# Patient Record
Sex: MALE | HISPANIC OR LATINO | Employment: FULL TIME | URBAN - METROPOLITAN AREA
[De-identification: names, ages, dates, MRNs, and addresses within clinical notes are randomized per-mention and may not be internally consistent; named-entity substitution may affect disease eponyms.]

---

## 2018-08-07 ENCOUNTER — OFFICE VISIT - HEALTHEAST (OUTPATIENT)
Dept: FAMILY MEDICINE | Facility: CLINIC | Age: 28
End: 2018-08-07

## 2018-08-07 DIAGNOSIS — H66.001 ACUTE SUPPURATIVE OTITIS MEDIA OF RIGHT EAR WITHOUT SPONTANEOUS RUPTURE OF TYMPANIC MEMBRANE, RECURRENCE NOT SPECIFIED: ICD-10-CM

## 2018-08-07 ASSESSMENT — MIFFLIN-ST. JEOR: SCORE: 1965.27

## 2018-09-10 ENCOUNTER — OFFICE VISIT - HEALTHEAST (OUTPATIENT)
Dept: FAMILY MEDICINE | Facility: CLINIC | Age: 28
End: 2018-09-10

## 2018-09-10 DIAGNOSIS — Z13.220 SCREENING FOR CHOLESTEROL LEVEL: ICD-10-CM

## 2018-09-10 DIAGNOSIS — Z00.00 ROUTINE GENERAL MEDICAL EXAMINATION AT A HEALTH CARE FACILITY: ICD-10-CM

## 2018-09-10 DIAGNOSIS — Z13.0 SCREENING FOR DEFICIENCY ANEMIA: ICD-10-CM

## 2018-09-10 DIAGNOSIS — Z11.3 SCREEN FOR STD (SEXUALLY TRANSMITTED DISEASE): ICD-10-CM

## 2018-09-10 DIAGNOSIS — R03.0 ELEVATED BLOOD PRESSURE READING WITHOUT DIAGNOSIS OF HYPERTENSION: ICD-10-CM

## 2018-09-10 DIAGNOSIS — R73.03 PRE-DIABETES: ICD-10-CM

## 2018-09-10 LAB
ALBUMIN SERPL-MCNC: 4.2 G/DL (ref 3.5–5)
ALP SERPL-CCNC: 74 U/L (ref 45–120)
ALT SERPL W P-5'-P-CCNC: 50 U/L (ref 0–45)
ANION GAP SERPL CALCULATED.3IONS-SCNC: 9 MMOL/L (ref 5–18)
AST SERPL W P-5'-P-CCNC: 24 U/L (ref 0–40)
BILIRUB SERPL-MCNC: 2 MG/DL (ref 0–1)
BUN SERPL-MCNC: 13 MG/DL (ref 8–22)
CALCIUM SERPL-MCNC: 9.5 MG/DL (ref 8.5–10.5)
CHLORIDE BLD-SCNC: 105 MMOL/L (ref 98–107)
CHOLEST SERPL-MCNC: 207 MG/DL
CO2 SERPL-SCNC: 27 MMOL/L (ref 22–31)
CREAT SERPL-MCNC: 0.92 MG/DL (ref 0.7–1.3)
ERYTHROCYTE [DISTWIDTH] IN BLOOD BY AUTOMATED COUNT: 11.3 % (ref 11–14.5)
GFR SERPL CREATININE-BSD FRML MDRD: >60 ML/MIN/1.73M2
GLUCOSE BLD-MCNC: 90 MG/DL (ref 70–125)
HBA1C MFR BLD: 5.1 % (ref 3.5–6)
HCT VFR BLD AUTO: 48.9 % (ref 40–54)
HGB BLD-MCNC: 16.8 G/DL (ref 14–18)
HIV 1+2 AB+HIV1 P24 AG SERPL QL IA: NEGATIVE
MCH RBC QN AUTO: 30.8 PG (ref 27–34)
MCHC RBC AUTO-ENTMCNC: 34.3 G/DL (ref 32–36)
MCV RBC AUTO: 90 FL (ref 80–100)
PLATELET # BLD AUTO: 201 THOU/UL (ref 140–440)
PMV BLD AUTO: 8.8 FL (ref 7–10)
POTASSIUM BLD-SCNC: 3.8 MMOL/L (ref 3.5–5)
PROT SERPL-MCNC: 7.3 G/DL (ref 6–8)
RBC # BLD AUTO: 5.44 MILL/UL (ref 4.4–6.2)
SODIUM SERPL-SCNC: 141 MMOL/L (ref 136–145)
TSH SERPL DL<=0.005 MIU/L-ACNC: 0.95 UIU/ML (ref 0.3–5)
WBC: 6.6 THOU/UL (ref 4–11)

## 2018-09-10 ASSESSMENT — MIFFLIN-ST. JEOR: SCORE: 1951.66

## 2018-09-11 LAB
C TRACH DNA SPEC QL PROBE+SIG AMP: NEGATIVE
CHOLEST SERPL-MCNC: 205 MG/DL
HDLC SERPL-MCNC: 31 MG/DL
LDLC SERPL CALC-MCNC: 135 MG/DL
N GONORRHOEA DNA SPEC QL NAA+PROBE: NEGATIVE
T PALLIDUM AB SER QL: NEGATIVE
TRIGL SERPL-MCNC: 197 MG/DL

## 2018-09-12 LAB
HAV IGM SERPL QL IA: NEGATIVE
HBV CORE IGM SERPL QL IA: NEGATIVE
HBV SURFACE AG SERPL QL IA: NEGATIVE
HCV AB SERPL QL IA: NEGATIVE

## 2018-10-01 ENCOUNTER — OFFICE VISIT - HEALTHEAST (OUTPATIENT)
Dept: FAMILY MEDICINE | Facility: CLINIC | Age: 28
End: 2018-10-01

## 2018-10-01 DIAGNOSIS — I10 ESSENTIAL HYPERTENSION: ICD-10-CM

## 2018-10-01 DIAGNOSIS — R74.8 ELEVATED LIVER ENZYMES: ICD-10-CM

## 2018-10-01 LAB
ALBUMIN SERPL-MCNC: 3.9 G/DL (ref 3.5–5)
ALP SERPL-CCNC: 78 U/L (ref 45–120)
ALT SERPL W P-5'-P-CCNC: 40 U/L (ref 0–45)
AST SERPL W P-5'-P-CCNC: 19 U/L (ref 0–40)
BILIRUB DIRECT SERPL-MCNC: 0.3 MG/DL
BILIRUB SERPL-MCNC: 1.5 MG/DL (ref 0–1)
PROT SERPL-MCNC: 7.1 G/DL (ref 6–8)

## 2018-11-26 ENCOUNTER — COMMUNICATION - HEALTHEAST (OUTPATIENT)
Dept: FAMILY MEDICINE | Facility: CLINIC | Age: 28
End: 2018-11-26

## 2018-11-26 DIAGNOSIS — I10 BENIGN ESSENTIAL HYPERTENSION: ICD-10-CM

## 2018-12-06 ENCOUNTER — COMMUNICATION - HEALTHEAST (OUTPATIENT)
Dept: FAMILY MEDICINE | Facility: CLINIC | Age: 28
End: 2018-12-06

## 2019-01-23 ENCOUNTER — COMMUNICATION - HEALTHEAST (OUTPATIENT)
Dept: FAMILY MEDICINE | Facility: CLINIC | Age: 29
End: 2019-01-23

## 2019-02-20 ENCOUNTER — OFFICE VISIT - HEALTHEAST (OUTPATIENT)
Dept: FAMILY MEDICINE | Facility: CLINIC | Age: 29
End: 2019-02-20

## 2019-02-20 DIAGNOSIS — I10 BENIGN ESSENTIAL HYPERTENSION: ICD-10-CM

## 2019-02-20 DIAGNOSIS — R42 LIGHTHEADEDNESS: ICD-10-CM

## 2019-03-01 ENCOUNTER — COMMUNICATION - HEALTHEAST (OUTPATIENT)
Dept: FAMILY MEDICINE | Facility: CLINIC | Age: 29
End: 2019-03-01

## 2019-03-01 DIAGNOSIS — I10 BENIGN ESSENTIAL HYPERTENSION: ICD-10-CM

## 2019-03-15 ENCOUNTER — COMMUNICATION - HEALTHEAST (OUTPATIENT)
Dept: SCHEDULING | Facility: CLINIC | Age: 29
End: 2019-03-15

## 2019-03-28 ENCOUNTER — COMMUNICATION - HEALTHEAST (OUTPATIENT)
Dept: FAMILY MEDICINE | Facility: CLINIC | Age: 29
End: 2019-03-28

## 2019-03-28 DIAGNOSIS — I10 BENIGN ESSENTIAL HYPERTENSION: ICD-10-CM

## 2019-07-25 ENCOUNTER — OFFICE VISIT - HEALTHEAST (OUTPATIENT)
Dept: FAMILY MEDICINE | Facility: CLINIC | Age: 29
End: 2019-07-25

## 2019-07-25 DIAGNOSIS — Z83.3 FAMILY HISTORY OF DIABETES MELLITUS: ICD-10-CM

## 2019-07-25 DIAGNOSIS — I10 ESSENTIAL HYPERTENSION: ICD-10-CM

## 2019-07-25 LAB
ANION GAP SERPL CALCULATED.3IONS-SCNC: 7 MMOL/L (ref 5–18)
BUN SERPL-MCNC: 13 MG/DL (ref 8–22)
CALCIUM SERPL-MCNC: 10 MG/DL (ref 8.5–10.5)
CHLORIDE BLD-SCNC: 106 MMOL/L (ref 98–107)
CO2 SERPL-SCNC: 29 MMOL/L (ref 22–31)
CREAT SERPL-MCNC: 0.99 MG/DL (ref 0.7–1.3)
GFR SERPL CREATININE-BSD FRML MDRD: >60 ML/MIN/1.73M2
GLUCOSE BLD-MCNC: 85 MG/DL (ref 70–125)
POTASSIUM BLD-SCNC: 4.4 MMOL/L (ref 3.5–5)
SODIUM SERPL-SCNC: 142 MMOL/L (ref 136–145)

## 2019-07-25 ASSESSMENT — MIFFLIN-ST. JEOR: SCORE: 1900.97

## 2019-09-30 ENCOUNTER — COMMUNICATION - HEALTHEAST (OUTPATIENT)
Dept: FAMILY MEDICINE | Facility: CLINIC | Age: 29
End: 2019-09-30

## 2019-09-30 DIAGNOSIS — I10 BENIGN ESSENTIAL HYPERTENSION: ICD-10-CM

## 2020-09-26 ENCOUNTER — COMMUNICATION - HEALTHEAST (OUTPATIENT)
Dept: FAMILY MEDICINE | Facility: CLINIC | Age: 30
End: 2020-09-26

## 2020-09-26 DIAGNOSIS — I10 BENIGN ESSENTIAL HYPERTENSION: ICD-10-CM

## 2020-10-28 ENCOUNTER — COMMUNICATION - HEALTHEAST (OUTPATIENT)
Dept: FAMILY MEDICINE | Facility: CLINIC | Age: 30
End: 2020-10-28

## 2020-10-28 DIAGNOSIS — I10 BENIGN ESSENTIAL HYPERTENSION: ICD-10-CM

## 2020-12-07 ENCOUNTER — COMMUNICATION - HEALTHEAST (OUTPATIENT)
Dept: FAMILY MEDICINE | Facility: CLINIC | Age: 30
End: 2020-12-07

## 2020-12-07 DIAGNOSIS — I10 BENIGN ESSENTIAL HYPERTENSION: ICD-10-CM

## 2020-12-14 ENCOUNTER — OFFICE VISIT - HEALTHEAST (OUTPATIENT)
Dept: FAMILY MEDICINE | Facility: CLINIC | Age: 30
End: 2020-12-14

## 2020-12-14 DIAGNOSIS — Z13.220 SCREENING FOR CHOLESTEROL LEVEL: ICD-10-CM

## 2020-12-14 DIAGNOSIS — Z13.220 SCREENING FOR LIPOID DISORDERS: Primary | ICD-10-CM

## 2020-12-14 DIAGNOSIS — I10 BENIGN ESSENTIAL HYPERTENSION: ICD-10-CM

## 2020-12-14 DIAGNOSIS — I10 BENIGN HYPERTENSION: ICD-10-CM

## 2021-04-18 ENCOUNTER — COMMUNICATION - HEALTHEAST (OUTPATIENT)
Dept: FAMILY MEDICINE | Facility: CLINIC | Age: 31
End: 2021-04-18

## 2021-04-19 ENCOUNTER — COMMUNICATION - HEALTHEAST (OUTPATIENT)
Dept: FAMILY MEDICINE | Facility: CLINIC | Age: 31
End: 2021-04-19

## 2021-04-19 DIAGNOSIS — I10 BENIGN ESSENTIAL HYPERTENSION: ICD-10-CM

## 2021-04-22 ENCOUNTER — AMBULATORY - HEALTHEAST (OUTPATIENT)
Dept: LAB | Facility: CLINIC | Age: 31
End: 2021-04-22

## 2021-04-22 ENCOUNTER — AMBULATORY - HEALTHEAST (OUTPATIENT)
Dept: FAMILY MEDICINE | Facility: CLINIC | Age: 31
End: 2021-04-22

## 2021-04-22 DIAGNOSIS — R73.01 ELEVATED FASTING GLUCOSE: ICD-10-CM

## 2021-04-22 DIAGNOSIS — Z13.220 SCREENING FOR CHOLESTEROL LEVEL: ICD-10-CM

## 2021-04-22 DIAGNOSIS — I10 BENIGN ESSENTIAL HYPERTENSION: ICD-10-CM

## 2021-04-22 LAB
ALBUMIN SERPL-MCNC: 4.3 G/DL (ref 3.5–5)
ALP SERPL-CCNC: 70 U/L (ref 45–120)
ALT SERPL W P-5'-P-CCNC: 93 U/L (ref 0–45)
ANION GAP SERPL CALCULATED.3IONS-SCNC: 9 MMOL/L (ref 5–18)
AST SERPL W P-5'-P-CCNC: 43 U/L (ref 0–40)
BILIRUB SERPL-MCNC: 2.1 MG/DL (ref 0–1)
BUN SERPL-MCNC: 9 MG/DL (ref 8–22)
CALCIUM SERPL-MCNC: 9.2 MG/DL (ref 8.5–10.5)
CHLORIDE BLD-SCNC: 102 MMOL/L (ref 98–107)
CHOLEST SERPL-MCNC: 225 MG/DL
CO2 SERPL-SCNC: 29 MMOL/L (ref 22–31)
CREAT SERPL-MCNC: 0.95 MG/DL (ref 0.7–1.3)
FASTING STATUS PATIENT QL REPORTED: NO
GFR SERPL CREATININE-BSD FRML MDRD: >60 ML/MIN/1.73M2
GLUCOSE BLD-MCNC: 101 MG/DL (ref 70–125)
HDLC SERPL-MCNC: 33 MG/DL
LDLC SERPL CALC-MCNC: 159 MG/DL
POTASSIUM BLD-SCNC: 4.1 MMOL/L (ref 3.5–5)
PROT SERPL-MCNC: 7.4 G/DL (ref 6–8)
SODIUM SERPL-SCNC: 140 MMOL/L (ref 136–145)
TRIGL SERPL-MCNC: 164 MG/DL

## 2021-04-23 ENCOUNTER — COMMUNICATION - HEALTHEAST (OUTPATIENT)
Dept: FAMILY MEDICINE | Facility: CLINIC | Age: 31
End: 2021-04-23

## 2021-04-27 ENCOUNTER — AMBULATORY - HEALTHEAST (OUTPATIENT)
Dept: FAMILY MEDICINE | Facility: CLINIC | Age: 31
End: 2021-04-27

## 2021-04-27 DIAGNOSIS — R79.89 ELEVATED LFTS: ICD-10-CM

## 2021-05-10 ENCOUNTER — HOSPITAL ENCOUNTER (OUTPATIENT)
Dept: ULTRASOUND IMAGING | Facility: CLINIC | Age: 31
Discharge: HOME | End: 2021-05-10
Attending: NURSE PRACTITIONER
Payer: COMMERCIAL

## 2021-05-10 DIAGNOSIS — R79.89 ELEVATED LFTS: ICD-10-CM

## 2021-05-11 ENCOUNTER — AMBULATORY - HEALTHEAST (OUTPATIENT)
Dept: FAMILY MEDICINE | Facility: CLINIC | Age: 31
End: 2021-05-11

## 2021-05-11 DIAGNOSIS — K76.0 HEPATIC STEATOSIS: ICD-10-CM

## 2021-05-27 NOTE — TELEPHONE ENCOUNTER
Refill Approved    Rx renewed per Medication Renewal Policy. Medication was last renewed on 3/4/19.    Janette Webb, [facility] Triage/Med Refill 3/29/2019     Requested Prescriptions   Pending Prescriptions Disp Refills     lisinopril (PRINIVIL,ZESTRIL) 20 MG tablet [Pharmacy Med Name: LISINOPRIL 20 MG TABLET] 30 tablet 0     Sig: TAKE 1 TABLET BY MOUTH EVERY DAY    [facility] [employer] Refill Protocol Passed - 3/28/2019  1:34 PM       Passed - PCP or prescribing provider visit in past 12 months      Last office visit with prescriber/PCP: 2/20/2019 Katarina Najera CNP OR same dept: 2/20/2019 Katarina Najera CNP OR same specialty: 2/20/2019 Katarina Najera CNP  Last physical: 9/10/2018 Last MTM visit: Visit date not found   Next visit within 3 mo: Visit date not found  Next physical within 3 mo: Visit date not found  Prescriber OR PCP: Katarina Najera CNP  Last diagnosis associated with med order: 1. Benign essential hypertension  - lisinopril (PRINIVIL,ZESTRIL) 20 MG tablet [Pharmacy Med Name: LISINOPRIL 20 MG TABLET]; TAKE 1 TABLET BY MOUTH EVERY DAY  Dispense: 30 tablet; Refill: 0    If protocol passes may refill for 12 months if within 3 months of last provider visit (or a total of 15 months).            Passed - Serum Potassium in past 12 months    Lab Results   Component Value Date    Potassium 3.8 09/10/2018            Passed - Blood pressure filed in past 12 months    BP Readings from Last 1 Encounters:   02/20/19 138/88            Passed - Serum Creatinine in past 12 months    Creatinine   Date Value Ref Range Status   09/10/2018 0.92 0.70 - 1.30 mg/dL Final

## 2021-05-30 NOTE — PROGRESS NOTES
Assessment & Plan   1. Essential hypertension  In general, blood pressures have been above goal range since starting on lisinopril 20 mg.  However, for the past week while he has been focusing intensely on diet and exercise readings have been consistently within goal range.  He feels more motivated after seeing this direct correlation.  He experienced good results after adopting more of an intermittent fasting pattern to eating.  We discussed his readings and potentially increasing his blood pressure medication versus continuing to work on diet and exercise.  He feels comfortable with continuing to keep up with his lifestyle modifications which I agree with.  He will plan to follow-up via my chart in 3 to 4 weeks with his readings, and if blood pressures remain above goal range we will increase his lisinopril to 40 mg.  Recheck BMP today  - Basic Metabolic Panel    2. Family history of diabetes mellitus  Recheck screening.  Congratulated on weight loss and increased exercise and dietary changes.  - Basic Metabolic Panel    Katarina Najera CNP    Subjective   Chief Complaint:  Hypertension    HPI:   Cristóbal Ryder is a 29 y.o. male who presents for hypertension.     He was last seen in clinic in February.  Amlodipine increased to 10mg at that time though experienced side effects of dizziness and blood pressure remained uncontrolled so he followed up via [facility] and was switched to lisinopril 20mg in March.  He states in terms of side effects he is felt much better on the lisinopril.  He still experiences dizziness occasionally though this is very infrequent and he is not even sure it is related to the blood pressure medication.  This past week readings have been well controlled as he has been focusing intensely on his diet and training for a 5K which he completed yesterday.  This weeks readings 120-130/80-85.  Prior to this week his readings have ranged from 120-150/.  Vast majority of the readings over goal  "range for systolic and diastolic.  He states in general he has been focusing on dietary changes and he has lost 15 pounds since his last appointment.  However, this past week he has focus more on intermittent fasting and has been fasting for 18 hours from overnight into the morning hours and then eating the rest of the day.  He states he has felt better, had more energy.  When blood pressures were higher he generally felt more tired and was experiencing headaches.    He also notes that he was able to connect with more of his paternal side of the family had a recent  and discovered that essentially all of his aunts and uncles have type 2 diabetes as well as grandparents.  He also notes that his mom is prediabetic.  His last screening 10 months ago was within normal range.      Allergies:  is allergic to amoxicillin.    SH/FH:  Social History and Family History reviewed and updated.   Tobacco Status:  He  reports that he has never smoked. He has never used smokeless tobacco.    Review of Systems:  A complete head to toe ROS is negative unless otherwise noted in HPI    Objective     Vitals:    19 0827   BP: 128/82   Patient Site: Right Arm   Patient Position: Sitting   Cuff Size: Adult Large   Pulse: 64   Weight: 219 lb (99.3 kg)   Height: 5' 7.25\" (1.708 m)       Physical Exam:  GENERAL: Alert, well-appearing male .   PSYCH: Pleasant mood, affect appropriate.    CV: Regular rate and rhythm without murmurs, rubs or gallops.  RESP: Lung sounds clear  PV :  No edema            "

## 2021-06-01 VITALS — BODY MASS INDEX: 36.57 KG/M2 | WEIGHT: 233 LBS | HEIGHT: 67 IN

## 2021-06-01 NOTE — TELEPHONE ENCOUNTER
Refill Approved    Rx renewed per Medication Renewal Policy. Medication was last renewed on 3/29/2019.  Last OV 7/25/2019.    Wendy Cabral, [facility] Triage/Med Refill 9/30/2019     Requested Prescriptions   Pending Prescriptions Disp Refills     lisinopril (PRINIVIL,ZESTRIL) 20 MG tablet [Pharmacy Med Name: LISINOPRIL 20 MG TABLET] 90 tablet 1     Sig: TAKE 1 TABLET BY MOUTH EVERY DAY       [facility] [employer] Refill Protocol Passed - 9/30/2019  1:44 AM        Passed - PCP or prescribing provider visit in past 12 months       Last office visit with prescriber/PCP: 7/25/2019 Katarina Najera CNP OR same dept: 7/25/2019 Katarina Najera CNP OR same specialty: 7/25/2019 Katarina Najera CNP  Last physical: 9/10/2018 Last MTM visit: Visit date not found   Next visit within 3 mo: Visit date not found  Next physical within 3 mo: Visit date not found  Prescriber OR PCP: Katarina Najera CNP  Last diagnosis associated with med order: 1. Benign essential hypertension  - lisinopril (PRINIVIL,ZESTRIL) 20 MG tablet [Pharmacy Med Name: LISINOPRIL 20 MG TABLET]; TAKE 1 TABLET BY MOUTH EVERY DAY  Dispense: 90 tablet; Refill: 1    If protocol passes may refill for 12 months if within 3 months of last provider visit (or a total of 15 months).             Passed - Serum Potassium in past 12 months     Lab Results   Component Value Date    Potassium 4.4 07/25/2019             Passed - Blood pressure filed in past 12 months     BP Readings from Last 1 Encounters:   07/25/19 128/82             Passed - Serum Creatinine in past 12 months     Creatinine   Date Value Ref Range Status   07/25/2019 0.99 0.70 - 1.30 mg/dL Final

## 2021-06-02 VITALS — BODY MASS INDEX: 36.4 KG/M2 | WEIGHT: 234.5 LBS

## 2021-06-02 VITALS — WEIGHT: 232 LBS | BODY MASS INDEX: 36.01 KG/M2

## 2021-06-02 VITALS — BODY MASS INDEX: 36.1 KG/M2 | WEIGHT: 230 LBS | HEIGHT: 67 IN

## 2021-06-03 VITALS — BODY MASS INDEX: 34.37 KG/M2 | WEIGHT: 219 LBS | HEIGHT: 67 IN

## 2021-06-11 NOTE — TELEPHONE ENCOUNTER
RN cannot approve Refill Request    RN can NOT refill this medication Protocol failed and NO refill given. Last office visit: 7/25/2019 Katarina Najera CNP Last Physical: 9/10/2018 Last MTM visit: Visit date not found Last visit same specialty: 7/25/2019 Katarina Najera CNP.  Next visit within 3 mo: Visit date not found  Next physical within 3 mo: Visit date not found      Janette Webb, [facility] Connection Triage/Med Refill 9/29/2020    Requested Prescriptions   Pending Prescriptions Disp Refills     lisinopriL (PRINIVIL,ZESTRIL) 20 MG tablet [Pharmacy Med Name: LISINOPRIL 20 MG TABLET] 90 tablet 3     Sig: TAKE 1 TABLET BY MOUTH EVERY DAY       [facility] [employer] Refill Protocol Failed - 9/26/2020  9:19 AM        Failed - PCP or prescribing provider visit in past 12 months       Last office visit with prescriber/PCP: 7/25/2019 Katarina Najera CNP OR same dept: Visit date not found OR same specialty: 7/25/2019 Katarina Najera CNP  Last physical: 9/10/2018 Last MTM visit: Visit date not found   Next visit within 3 mo: Visit date not found  Next physical within 3 mo: Visit date not found  Prescriber OR PCP: Katarina Najera CNP  Last diagnosis associated with med order: 1. Benign essential hypertension  - lisinopriL (PRINIVIL,ZESTRIL) 20 MG tablet [Pharmacy Med Name: LISINOPRIL 20 MG TABLET]; TAKE 1 TABLET BY MOUTH EVERY DAY  Dispense: 90 tablet; Refill: 3    If protocol passes may refill for 12 months if within 3 months of last provider visit (or a total of 15 months).             Failed - Serum Potassium in past 12 months     No results found for: LN-POTASSIUM          Failed - Blood pressure filed in past 12 months     BP Readings from Last 1 Encounters:   07/25/19 128/82             Failed - Serum Creatinine in past 12 months     Creatinine   Date Value Ref Range Status   07/25/2019 0.99 0.70 - 1.30 mg/dL Final

## 2021-06-12 NOTE — TELEPHONE ENCOUNTER
RN cannot approve Refill Request    RN can NOT refill this medication PCP messaged that patient is overdue for Labs. Last office visit: 7/25/2019 Katarina Najera CNP Last Physical: 9/10/2018 Last MTM visit: Visit date not found Last visit same specialty: 7/25/2019 Katarina Najera CNP.  Next visit within 3 mo: Visit date not found  Next physical within 3 mo: Visit date not found      Kelley Nicholson, [facility] Triage/Med Refill 11/1/2020    Requested Prescriptions   Pending Prescriptions Disp Refills     lisinopriL (PRINIVIL,ZESTRIL) 20 MG tablet [Pharmacy Med Name: LISINOPRIL 20 MG TABLET] 30 tablet 0     Sig: TAKE 1 TABLET BY MOUTH EVERY DAY       [facility] [employer] Refill Protocol Failed - 10/28/2020 11:37 AM        Failed - PCP or prescribing provider visit in past 12 months       Last office visit with prescriber/PCP: 7/25/2019 Katarina Najera CNP OR same dept: Visit date not found OR same specialty: 7/25/2019 Katarina Najera CNP  Last physical: 9/10/2018 Last MTM visit: Visit date not found   Next visit within 3 mo: Visit date not found  Next physical within 3 mo: Visit date not found  Prescriber OR PCP: Katarina Najera CNP  Last diagnosis associated with med order: 1. Benign essential hypertension  - lisinopriL (PRINIVIL,ZESTRIL) 20 MG tablet [Pharmacy Med Name: LISINOPRIL 20 MG TABLET]; TAKE 1 TABLET BY MOUTH EVERY DAY  Dispense: 30 tablet; Refill: 0    If protocol passes may refill for 12 months if within 3 months of last provider visit (or a total of 15 months).             Failed - Serum Potassium in past 12 months     No results found for: LN-POTASSIUM          Failed - Blood pressure filed in past 12 months     BP Readings from Last 1 Encounters:   07/25/19 128/82             Failed - Serum Creatinine in past 12 months     Creatinine   Date Value Ref Range Status   07/25/2019 0.99 0.70 - 1.30 mg/dL Final

## 2021-06-13 NOTE — PROGRESS NOTES
"Cristóbal Ryder is a 30 y.o. male who is being evaluated via a billable video visit.      The patient has been notified of following:     \"This video visit will be conducted via a call between you and your physician/provider. We have found that certain health care needs can be provided without the need for an in-person physical exam.  This service lets us provide the care you need with a video conversation.  If a prescription is necessary we can send it directly to your pharmacy.  If lab work is needed we can place an order for that and you can then stop by our lab to have the test done at a later time.    Video visits are billed at different rates depending on your insurance coverage. Please reach out to your insurance provider with any questions.    If during the course of the call the physician/provider feels a video visit is not appropriate, you will not be charged for this service.\"    Patient has given verbal consent to a Video visit? Yes  How would you like to obtain your AVS? AVS Preference: MyChart.  If dropped by the video visit, the video invitation should be sent to: Text to cell phone: cell  Will anyone else be joining your video visit? No        Video Start Time: 8:41 AM    Additional provider notes:     Assessment & Plan   1. Benign essential hypertension  Blood pressure currently above goal range averaging 130s/80s.  He was, however, previously well controlled with lisinopril. Has gained some weight and more sedentary right now.  Feeling motivated to start exercising with new bike and get some weight off.  Will continue on 20mg dose and work on lifestyle.  Recheck six months.  Future orders for labs placed.  Will fax these to the  [place] office  - lisinopriL (PRINIVIL,ZESTRIL) 20 MG tablet; Take 1 tablet (20 mg total) by mouth daily. LAST REFILL. DUE FOR  MED CHECK  Dispense: 90 tablet; Refill: 1  - Comprehensive Metabolic Panel; Future    2. Screening for cholesterol level  - Lip[facility]; " Aleisha Najera CNP    Subjective   Chief Complaint:  Follow-up (yearly refill need on lisipril)    HPI:   Cristóbal Ryder is a 30 y.o. male who presents for hypertension visit.      Currently on lisinopril 20mg for hypertension. Started two years ago.   He states he has gained some weight with being more sedentary and recognizes this has had an effect on his blood pressure.  Just bought a peloton though no current exercise other than an occasional walk. Practicing intermittent fasting during the week.  A few months ago numbers were 110s-120s/80s.  Now 130/80.  Highest diastolic 86, highest systolic 137.      Allergies:  is allergic to amoxicillin.    SH/FH:  Social History and Family History reviewed and updated.   Tobacco Status:  He  reports that he has never smoked. He has never used smokeless tobacco.    Review of Systems:  A complete head to toe ROS is negative unless otherwise noted in HPI    Objective   There were no vitals filed for this visit.    Physical Exam:  GENERAL: Alert, well-appearing   PSYCH: Pleasant mood, affect appropriate.    Video-Visit Details    Type of service:  Video Visit    Video End Time (time video stopped): 8:51 AM  Originating Location (pt. Location): Home    Distant Location (provider location):  [facility]     Platform used for Video Visit: Kimberly Najera CNP

## 2021-06-13 NOTE — TELEPHONE ENCOUNTER
RN cannot approve Refill Request    RN can NOT refill this medication Protocol failed and NO refill given. Last office visit: 7/25/2019 Katarina Najera CNP Last Physical: 9/10/2018 Last MTM visit: Visit date not found Last visit same specialty: 7/25/2019 Katarina Najera CNP.  Next visit within 3 mo: Visit date not found  Next physical within 3 mo: Visit date not found      Janette Webb, [facility] Connection Triage/Med Refill 12/8/2020    Requested Prescriptions   Pending Prescriptions Disp Refills     lisinopriL (PRINIVIL,ZESTRIL) 20 MG tablet [Pharmacy Med Name: LISINOPRIL 20 MG TABLET] 30 tablet 0     Sig: TAKE 1 TABLET (20 MG TOTAL) BY MOUTH DAILY. LAST REFILL. DUE FOR MED CHECK       [facility] [employer] Refill Protocol Failed - 12/7/2020 11:35 AM        Failed - PCP or prescribing provider visit in past 12 months       Last office visit with prescriber/PCP: 7/25/2019 Katarina Najera CNP OR same dept: Visit date not found OR same specialty: 7/25/2019 Katarina Najera CNP  Last physical: 9/10/2018 Last MTM visit: Visit date not found   Next visit within 3 mo: Visit date not found  Next physical within 3 mo: Visit date not found  Prescriber OR PCP: Katarina Najera CNP  Last diagnosis associated with med order: 1. Benign essential hypertension  - lisinopriL (PRINIVIL,ZESTRIL) 20 MG tablet [Pharmacy Med Name: LISINOPRIL 20 MG TABLET]; Take 1 tablet (20 mg total) by mouth daily. LAST REFILL. DUE FOR  MED CHECK  Dispense: 30 tablet; Refill: 0    If protocol passes may refill for 12 months if within 3 months of last provider visit (or a total of 15 months).             Failed - Serum Potassium in past 12 months     No results found for: LN-POTASSIUM          Failed - Blood pressure filed in past 12 months     BP Readings from Last 1 Encounters:   07/25/19 128/82             Failed - Serum Creatinine in past 12 months     Creatinine   Date Value Ref Range Status   07/25/2019 0.99 0.70 - 1.30 mg/dL Final

## 2021-06-16 NOTE — TELEPHONE ENCOUNTER
Left message to call back for: Cristóbal  Information to relay to patient:  Message from Katarina Najera CNP  
Patient is calling back to report that he did not have lab work done. Will make appointment to do them now.  
RN cannot approve Refill Request    RN can NOT refill this medication Protocol failed and NO refill given. Last office visit: 7/25/2019 Katarina Najera CNP Last Physical: 9/10/2018 Last MTM visit: Visit date not found Last visit same specialty: 7/25/2019 Katarina Najera CNP.  Next visit within 3 mo: Visit date not found  Next physical within 3 mo: Visit date not found      Ace Woodson, [facility] Triage/Med Refill 4/19/2021    Requested Prescriptions   Pending Prescriptions Disp Refills     lisinopriL (PRINIVIL,ZESTRIL) 20 MG tablet 90 tablet 1     Sig: Take 1 tablet (20 mg total) by mouth daily. LAST REFILL. DUE FOR  MED CHECK       [facility] [employer] Refill Protocol Failed - 4/19/2021  9:04 AM        Failed - Serum Potassium in past 12 months     No results found for: LN-POTASSIUM          Failed - Blood pressure filed in past 12 months     BP Readings from Last 1 Encounters:   07/25/19 128/82             Failed - Serum Creatinine in past 12 months     Creatinine   Date Value Ref Range Status   07/25/2019 0.99 0.70 - 1.30 mg/dL Final             Passed - PCP or prescribing provider visit in past 12 months       Last office visit with prescriber/PCP: 7/25/2019 Katarina Najera CNP OR same dept: Visit date not found OR same specialty: 7/25/2019 Katarina Najera CNP  Last physical: 9/10/2018 Last MTM visit: Visit date not found   Next visit within 3 mo: Visit date not found  Next physical within 3 mo: Visit date not found  Prescriber OR PCP: Katarina Najera CNP  Last diagnosis associated with med order: 1. Benign essential hypertension  - lisinopriL (PRINIVIL,ZESTRIL) 20 MG tablet; Take 1 tablet (20 mg total) by mouth daily. LAST REFILL. DUE FOR  MED CHECK  Dispense: 90 tablet; Refill: 1    If protocol passes may refill for 12 months if within 3 months of last provider visit (or a total of 15 months).                   
We did a med check in Dec. 2020 and he was going to have labs drawn at [facility].  I have not received these. Can we call and check? If not, he needs labs now.  If done we can wait until June when we had planned to do another visit.    
no

## 2021-06-19 NOTE — PROGRESS NOTES
"OFFICE VISIT - FAMILY MEDICINE     ASSESSMENT AND PLAN     1. Acute suppurative otitis media of right ear without spontaneous rupture of tympanic membrane, recurrence not specified  azithromycin (ZITHROMAX Z-GAYE) 250 MG tablet   We have discussed about treatment option, azithromycin was prescribed to use as directed, prevent side effect with probiotics, good hydration, Tylenol or ibuprofen as needed for pain management, return if not improving.  Healthcare maintenance, patient make an appointment for a general physical exam at his earliest convenience.    CHIEF COMPLAINT   Ear Pain (R X 2 DAYS)    HPI   Cristóbal Ryder is a 28 y.o. male.  No Patient Care Coordination Note on file.    Right ear pain for the past 48 hours, with muffling sensation, sensation of liquid in the ears, no drainage, no hearing loss no drinking.  History of otitis media at a young age.  Past medical surgery significant for appendectomy, wisdom tooth extraction, past medical history significant for C. difficile infection following amoxicillin treatment after wisdom teeth extraction.  He works in IT department, non-smoker.      Review of Systems As per HPI, otherwise negative.    OBJECTIVE   /80 (Patient Site: Left Arm)  Pulse 64  Temp 98.3  F (36.8  C) (Oral)   Resp 13  Ht 5' 7.3\" (1.709 m)  Wt (!) 233 lb (105.7 kg)  BMI 36.17 kg/m2  Physical Exam   Constitutional: He is oriented to person, place, and time. He appears well-developed and well-nourished.   HENT:   Head: Normocephalic and atraumatic.   Right Ear: There is tenderness. Tympanic membrane is erythematous.   Left Ear: Hearing and tympanic membrane normal.   Neck: Normal range of motion. Neck supple. No JVD present. No tracheal deviation present. No thyromegaly present.   Cardiovascular: Normal rate, regular rhythm, normal heart sounds and intact distal pulses.  Exam reveals no gallop and no friction rub.    No murmur heard.  Pulmonary/Chest: Effort normal and breath " sounds normal. No respiratory distress. He has no wheezes. He has no rales.   Musculoskeletal: He exhibits no edema or tenderness.   Lymphadenopathy:     He has no cervical adenopathy.   Neurological: He is alert and oriented to person, place, and time. Coordination normal.   Psychiatric: He has a normal mood and affect. Judgment and thought content normal.       [facility]   No family history on file.  Social History     Social History     Marital status:      Spouse name: N/A     Number of children: N/A     Years of education: N/A     Occupational History     Not on file.     Social History Main Topics     Smoking status: Not on file     Smokeless tobacco: Not on file     Alcohol use Not on file     Drug use: Not on file     Sexual activity: Not on file     Other Topics Concern     Not on file     Social History Narrative     Relevant history was reviewed with the patient today, unless noted in HPI, nothing is pertinent for this visit.  [facility]   There are no active problems to display for this patient.    No past surgical history on file.    RESULTS/CONSULTS (Lab/Rad)   No results found for this or any previous visit (from the past 168 hour(s)).  No results found.  MEDICATIONS     No current outpatient prescriptions on file prior to visit.     No current facility-administered medications on file prior to visit.        HEALTH MAINTENANCE / SCREENING   PHQ-2 Total Score: 0 (8/7/2018  2:48 PM), No Data Recorded,No Data Recorded    There is no immunization history on file for this patient.  Health Maintenance   Topic     TD 18+ HE      TDAP ADULT ONE TIME DOSE      ADVANCE DIRECTIVES DISCUSSED WITH PATIENT      INFLUENZA VACCINE RULE BASED (1)       Yadiel Schmidt MD  Family Medicine, [facility]     This note was dictated using a voice recognition software.  Any grammatical or context distortion are unintentional and inherent to the software.

## 2021-06-20 NOTE — PROGRESS NOTES
MALE PREVENTIVE EXAM    Assessment & Plan   1. Routine general medical examination at a health care facility  - Influenza, Seasonal Quad, Preservative Free 36+ Months    2. Elevated blood pressure reading without diagnosis of hypertension:  Blood pressure elevated today and he states he has been told this is true in the past as well.  Check labs, we will have him return in 2-3 weeks to recheck and if persistently elevated, begin medication at that time. Counseled on dietary changes, exercise and alcohol.    - Thyroid Ballard  - Comprehensive Metabolic Panel    3. Pre-diabetes  - Glycosylated Hemoglobin A1c    4. Screening for cholesterol level  - Cholesterol, Total  - Lipid Cascade    5. Screening for deficiency anemia  - HM2(CBC w/o Differential)    6. Screen for STD (sexually transmitted disease)  - Chlamydia trachomatis & Neisseria gonorrhoeae, Amplified Detection  - HIV Antigen/Antibody Screening [place]  - Syphilis Screen, Cascade  - Hepatitis Acute Evaluation    Alcohol use, safety and moderation discussed.  Recommended adequate calcium intake/osteoporosis prevention.  Discussed colon cancer screening at age 50, 45 if -American.  Diet discuss, including moderation of portions sizes, avoiding eating out and fast food and increase in fruits and vegetables.  Discussed safe sex practices.  Discussed & recommended seat belt (& motorcycle helmet) use.      Katarina Najera CNP    Subjective:   Chief Complaint:  Annual Exam (fasting)    HPI:  Cristóbal Ryder is a 28 y.o. male who presents for routine physical exam.  He is a new patient to the clinic. No previous consistent care, PMH notable for pre-diabetes, otherwise negative.       H/o pre diabetes.  H of diabetes in mother and [facility].  He has been working on regular exercise and healthy diet.  Working out several times a week with cardio and strength training.     H/o c. Diff infection five years ago. Struggled with irritable bowel symptoms for many years  "after. Started on probiotic this summer and states it has 'changed my life'.  Bowels now regular.      Blood pressure : Has been told blood pressure elevated in the past, no previous diagnosis of HTN or medications. Today 138/98, 142/90 on recheck.  FMH of HTN in mother and MGM.     Preventive Health:  Reviewed and recommended screening and treatment recommendations:  Immunizations: believes up to date    Health Habits:    Exercise: yes  Balanced Diet: yes  Seat Belt Use: YES  Calcium intake/Osteoporosis prevention: no  Guns: NO  Sun Screen: YES  Dental Care: YES    PMH:   There is no problem list on file for this patient.      No past medical history on file.    Current Medications: Reviewed  No current outpatient prescriptions on file prior to visit.     No current facility-administered medications on file prior to visit.        Allergies: Reviewed   is allergic to amoxicillin.    Social History:  Social History     Occupational History     Not on file.     Social History Main Topics     Smoking status: Never Smoker     Smokeless tobacco: Never Used     Alcohol use Yes      Comment: 1/week     Drug use: No     Sexual activity: Yes     Partners: Female       Family History:   Family History   Problem Relation Age of Onset     Hypertension Mother          Review of Systems:  Complete head to toe review of systems is otherwise negative except as above.    Objective:    BP (!) 138/98 (Patient Site: Left Arm, Patient Position: Sitting, Cuff Size: Adult Large)  Pulse 74  Ht 5' 7.3\" (1.709 m)  Wt (!) 230 lb (104.3 kg)  SpO2 99%  BMI 35.7 kg/m2    GENERAL: Alert, well-appearing male.   PSYCH: Pleasant mood, affect appropriate.    SKIN: No atypical lesions  EYES: Conjunctiva pink, sclera white, no exudates. ALEKS.  EOMs intact.   EARS: TMs pearly grey, no bulging, redness, retraction.   MOUTH: Pharynx moist, pink without exudate. No tonsillar enlargement  NECK: No lymphadenopathy. Thyroid borders smooth without " enlargement, nodules.   CV: Regular rate and rhythm without murmurs, rubs or gallops.  RESP: Lung sounds clear, symmetric excursion.   ABDOMEN: BS+. Abdomen soft.  No organomegaly, masses or hernias  :  Normal scrotum.  Testes descended bilaterally without mass or hernia. Penis circumcised without lesions or discharge.  PV : No edema

## 2021-06-20 NOTE — PROGRESS NOTES
Assessment & Plan   1. Essential hypertension: Discussed confirmed diagnosis today though initial reading was much improved.  At this point I think it is reasonable for him to work intensely on exercise and weight loss over the next three months and recheck at that time.  He is open to starting a medication if needed.  Will also have him monitor at home and he will come in sooner if readings are consistently >140/90.     2. Elevated liver enzymes:  Recheck today, discussed this is likely fatty liver.  Again, focus on weight loss and healthy diet.   - Hepatic Profile    Katarina Najera CNP    Subjective   Chief Complaint:  Hypertension    HPI:   Cristóbal Ryder is a 28 y.o. male who presents for elevated blood pressure.      He was seen three weeks ago for physical, blood pressure elevated at that time to 142/90.  Had been notified of elevated readings in the past though never diagnosed or treated for hypertension. Counseled on exercise, diet and alcohol cessation. Today 122/88 initially. 144/90 on recheck.  He states he has continued to work out fairly regularly, work on cutting back on alcohol. Does not bring any numbers from home.     Liver enzymes elevated at physical.  ALT 50, bilirubin 2.0. No prior history of liver disease or cholecystitis.       Allergies:  is allergic to amoxicillin.    SH/FH:  Social History and Family History reviewed and updated.   Tobacco Status:  He  reports that he has never smoked. He has never used smokeless tobacco.    Review of Systems:  A complete head to toe ROS is negative unless otherwise noted in HPI    Objective     Vitals:    10/01/18 0903   BP: 122/88   Patient Site: Left Arm   Patient Position: Sitting   Cuff Size: Adult Large   Pulse: 72   SpO2: 98%   Weight: (!) 232 lb (105.2 kg)       Physical Exam:  GENERAL: Alert, well-appearing male  CV: Regular rate and rhythm without murmurs, rubs or gallops.  RESP: Lung sounds clear

## 2021-06-23 NOTE — TELEPHONE ENCOUNTER
Pt had to cancel her appt for HTN check up.    Been monitoring at home, average readings 130-135-/84-87.  Can she do nurse only visit for BP documentation?

## 2021-06-24 NOTE — PROGRESS NOTES
Assessment & Plan   1. Benign essential hypertension  Diastolic readings have been consistently elevated at home as well as in clinic today.  Over 50% of systolics over 140 as well.  Ultimately decided to increase amlodipine to 10 mg and he will follow-up with his readings in 3-4 weeks.  If he is not experiencing any side effects with the medication.  - amLODIPine (NORVASC) 10 MG tablet; Take 1 tablet (10 mg total) by mouth daily.  Dispense: 90 tablet; Refill: 1    2. Lightheadedness  Symptoms of lightheadedness as well as vertigo 2 days ago.  This has resolved completely.  He denies any other red flag symptoms and his exam is normal today.  Unclear if this is related to anxiety.  At this point we will continue to monitor.    Katarina Najera CNP    Subjective   Chief Complaint:  No chief complaint on file.    HPI:   Cristóbal Ryder is a 28 y.o. male who presents for hypertension    He was seen four months ago for mild hypertension and advised to monitor at home while working on exercise and weight loss.  He communicated via [employer] in November with persistently elevated home readings, systolic 140-150 and diastolic mid-90s. He was started on amlodipine 5mg at that time.  He is here today for recheck.      He states he was not feeling well on Monday with nausea and headache and when he checked his blood pressure was 152/86.  Yesterday 142/86.  He did start a new job on Monday so is unsure if his symptoms were related to some anxiety.  He does endorse a sensation of the room spinning for several minutes.  When this resolved he felt slightly lightheaded but was able to continue on with his day.  Later in the day he had a mild tension headache though nothing significant.  He denies any vision changes.  He denies palpitations.  No chest pain or shortness of breath.  Symptoms improved yesterday and today he states he feels back to his baseline.    In reviewing his blood pressure readings from home this past month but the  majority systolic range from 135-150.  Diastolic fairly consistently 84-92.  He is exercising twice a week with cardio and resistance training.  Has made some modifications to diet though nothing consistent.  He states he usually fasts during the morning and early afternoon and eats a meal late afternoon and later in the evening.  He does endorse high carbohydrate diet at times.    Allergies:  is allergic to amoxicillin.    SH/FH:  Social History and Family History reviewed and updated.   Tobacco Status:  He  reports that  has never smoked. he has never used smokeless tobacco.    Review of Systems:  A complete head to toe ROS is negative unless otherwise noted in HPI    Objective   There were no vitals filed for this visit.    Physical Exam:  GENERAL: Alert, well-appearing male .   EYES: Conjunctiva pink, sclera white, no exudates. ALEKS.  EOMs intact. Undilated fundoscopic exam normal  EARS: TMs pearly grey, no bulging, redness, retraction.   CV: Regular rate and rhythm without murmurs, rubs or gallops.  RESP: Lung sounds clear  NEURO: Alert, oriented x3 CNs 2-12 intact.Normal gait.  Negative Romberg

## 2021-06-24 NOTE — PATIENT INSTRUCTIONS - HE
Recommendations from today's visit                                                       1. We increased your amlodipine to 10mg today.  Please follow up via [employer] in 3-4 weeks with your readings    Next appointment: six months    To reschedule your appointment, please call the clinic directly at [phone].   It was a pleasure seeing you today! I look forward to seeing you again.

## 2021-06-25 NOTE — TELEPHONE ENCOUNTER
Pt reports [place] and forgot his BP medication at home. Pt has a refill on file per Epic.     Advised pt to contact his pharmacy for a pharmacy to pharmacy transfer. Call back if any concerns arise.     Pt verbalizes understanding and agrees to plan.     Reason for Disposition    Caller has medication question only, adult not sick, and triager answers question    Protocols used: MEDICATION QUESTION CALL-A-

## 2021-06-27 ENCOUNTER — HEALTH MAINTENANCE LETTER (OUTPATIENT)
Age: 31
End: 2021-06-27

## 2021-07-03 NOTE — ADDENDUM NOTE
Addendum Note by Rita Ansari CNP at 11/27/2018 12:17 PM     Author: Rita Ansari CNP Service: -- Author Type: Nurse Practitioner    Filed: 11/27/2018 12:17 PM Encounter Date: 11/26/2018 Status: Signed    : Rita Ansari CNP (Nurse Practitioner)    Addended by: RITA ANSARI on: 11/27/2018 12:17 PM        Modules accepted: Orders

## 2021-08-08 DIAGNOSIS — I10 BENIGN ESSENTIAL HYPERTENSION: ICD-10-CM

## 2021-08-08 DIAGNOSIS — R73.01 ELEVATED FASTING GLUCOSE: Primary | ICD-10-CM

## 2021-08-08 RX ORDER — LISINOPRIL 20 MG/1
20 TABLET ORAL DAILY
COMMUNITY
Start: 2021-04-20 | End: 2021-08-08

## 2021-08-11 RX ORDER — LISINOPRIL 20 MG/1
20 TABLET ORAL DAILY
Qty: 90 TABLET | Refills: 0 | Status: SHIPPED | OUTPATIENT
Start: 2021-08-11 | End: 2021-09-06

## 2021-09-01 ENCOUNTER — OFFICE VISIT (OUTPATIENT)
Dept: FAMILY MEDICINE | Facility: CLINIC | Age: 31
End: 2021-09-01
Payer: COMMERCIAL

## 2021-09-01 VITALS
WEIGHT: 234.2 LBS | HEART RATE: 74 BPM | BODY MASS INDEX: 36.41 KG/M2 | DIASTOLIC BLOOD PRESSURE: 80 MMHG | SYSTOLIC BLOOD PRESSURE: 118 MMHG | OXYGEN SATURATION: 98 %

## 2021-09-01 DIAGNOSIS — R20.9 DISTURBANCE OF SKIN SENSATION: ICD-10-CM

## 2021-09-01 DIAGNOSIS — E66.01 MORBID OBESITY (H): ICD-10-CM

## 2021-09-01 DIAGNOSIS — Z13.220 SCREENING FOR LIPOID DISORDERS: ICD-10-CM

## 2021-09-01 DIAGNOSIS — R73.01 ELEVATED FASTING GLUCOSE: ICD-10-CM

## 2021-09-01 DIAGNOSIS — I10 BENIGN HYPERTENSION: Primary | ICD-10-CM

## 2021-09-01 DIAGNOSIS — K76.0 HEPATIC STEATOSIS: ICD-10-CM

## 2021-09-01 PROBLEM — H43.823 VITREOMACULAR ADHESION OF BOTH EYES: Status: ACTIVE | Noted: 2019-10-28

## 2021-09-01 PROBLEM — H35.412 LATTICE DEGENERATION, LEFT: Status: ACTIVE | Noted: 2019-10-28

## 2021-09-01 PROBLEM — H33.22 RD (RETINAL DETACHMENT), LEFT: Status: ACTIVE | Noted: 2019-10-28

## 2021-09-01 LAB
ALBUMIN SERPL-MCNC: 4 G/DL (ref 3.5–5)
ALP SERPL-CCNC: 68 U/L (ref 45–120)
ALT SERPL W P-5'-P-CCNC: 54 U/L (ref 0–45)
ANION GAP SERPL CALCULATED.3IONS-SCNC: 10 MMOL/L (ref 5–18)
AST SERPL W P-5'-P-CCNC: 26 U/L (ref 0–40)
BILIRUB SERPL-MCNC: 1.9 MG/DL (ref 0–1)
BUN SERPL-MCNC: 11 MG/DL (ref 8–22)
CALCIUM SERPL-MCNC: 9.6 MG/DL (ref 8.5–10.5)
CHLORIDE BLD-SCNC: 103 MMOL/L (ref 98–107)
CHOLEST SERPL-MCNC: 215 MG/DL
CO2 SERPL-SCNC: 26 MMOL/L (ref 22–31)
CREAT SERPL-MCNC: 0.89 MG/DL (ref 0.7–1.3)
FASTING STATUS PATIENT QL REPORTED: YES
FERRITIN SERPL-MCNC: 142 NG/ML (ref 27–300)
GFR SERPL CREATININE-BSD FRML MDRD: >90 ML/MIN/1.73M2
GLUCOSE BLD-MCNC: 97 MG/DL (ref 70–125)
HBA1C MFR BLD: 5.4 % (ref 0–5.6)
HDLC SERPL-MCNC: 36 MG/DL
IRON SATN MFR SERPL: 44 % (ref 20–50)
IRON SERPL-MCNC: 109 UG/DL (ref 42–175)
LDLC SERPL CALC-MCNC: 150 MG/DL
POTASSIUM BLD-SCNC: 4.3 MMOL/L (ref 3.5–5)
PROT SERPL-MCNC: 7.5 G/DL (ref 6–8)
SODIUM SERPL-SCNC: 139 MMOL/L (ref 136–145)
TIBC SERPL-MCNC: 248 UG/DL (ref 313–563)
TRANSFERRIN SERPL-MCNC: 198 MG/DL (ref 212–360)
TRIGL SERPL-MCNC: 145 MG/DL

## 2021-09-01 PROCEDURE — 83516 IMMUNOASSAY NONANTIBODY: CPT | Mod: 90 | Performed by: NURSE PRACTITIONER

## 2021-09-01 PROCEDURE — 86704 HEP B CORE ANTIBODY TOTAL: CPT | Performed by: NURSE PRACTITIONER

## 2021-09-01 PROCEDURE — 36415 COLL VENOUS BLD VENIPUNCTURE: CPT | Performed by: NURSE PRACTITIONER

## 2021-09-01 PROCEDURE — 80053 COMPREHEN METABOLIC PANEL: CPT | Performed by: NURSE PRACTITIONER

## 2021-09-01 PROCEDURE — 82728 ASSAY OF FERRITIN: CPT | Performed by: NURSE PRACTITIONER

## 2021-09-01 PROCEDURE — 86706 HEP B SURFACE ANTIBODY: CPT | Performed by: NURSE PRACTITIONER

## 2021-09-01 PROCEDURE — 84466 ASSAY OF TRANSFERRIN: CPT | Performed by: NURSE PRACTITIONER

## 2021-09-01 PROCEDURE — 99000 SPECIMEN HANDLING OFFICE-LAB: CPT | Performed by: NURSE PRACTITIONER

## 2021-09-01 PROCEDURE — 86803 HEPATITIS C AB TEST: CPT | Performed by: NURSE PRACTITIONER

## 2021-09-01 PROCEDURE — 99214 OFFICE O/P EST MOD 30 MIN: CPT | Performed by: NURSE PRACTITIONER

## 2021-09-01 PROCEDURE — 83540 ASSAY OF IRON: CPT | Performed by: NURSE PRACTITIONER

## 2021-09-01 PROCEDURE — 87340 HEPATITIS B SURFACE AG IA: CPT | Performed by: NURSE PRACTITIONER

## 2021-09-01 PROCEDURE — 83036 HEMOGLOBIN GLYCOSYLATED A1C: CPT | Performed by: NURSE PRACTITIONER

## 2021-09-01 PROCEDURE — 86038 ANTINUCLEAR ANTIBODIES: CPT | Performed by: NURSE PRACTITIONER

## 2021-09-01 PROCEDURE — 80061 LIPID PANEL: CPT | Performed by: NURSE PRACTITIONER

## 2021-09-01 PROCEDURE — 86708 HEPATITIS A ANTIBODY: CPT | Performed by: NURSE PRACTITIONER

## 2021-09-01 NOTE — PROGRESS NOTES
Assessment & Plan   1. Benign hypertension  Just at goal with lisinopril 20mg.  He is exercising regularly and intends to focus more intensely on diet.  Has cut out alcohol completely.  Check labs, follow up 6-12 months.   - Comprehensive metabolic panel (BMP + Alb, Alk Phos, ALT, AST, Total. Bili, TP)  - Lipid panel reflex to direct LDL Fasting  - lisinopril (ZESTRIL) 20 MG tablet; Take 1 tablet (20 mg) by mouth daily  Dispense: 90 tablet; Refill: 3    2. Hepatic steatosis  Labs for secondary causes.  If negative discussed importance of focus on dietary changes and weight loss.  Continue to monitor hepatic profile at least annually.   - F Actin EIA with reflex  - Anti Nuclear Melissa IgG by IFA with Reflex  - Ferritin  - Iron binding panel  - Hepatitis B core antibody  - Hepatitis B surface antigen  - Hepatitis B Surface Antibody  - Hepatitis A Antibody IgG  - Hepatitis C antibody    3. Disturbance of skin sensation  Sensation of pressure on the calves, right > left.  Resolves completely with activity. No edema, erythema or warmth.  Discussed this could be some mild venous insufficiency. Encouraged to follow up if sensation worsens.      4. Elevated fasting glucose  - Hemoglobin A1c    5. Screening for lipoid disorders  - Comprehensive metabolic panel (BMP + Alb, Alk Phos, ALT, AST, Total. Bili, TP)  - Lipid panel reflex to direct LDL Fasting    6. Morbid obesity (H)  BMI >35 with comorbidity of HTN.  Counseling on diet and weight loss.      Katarina Najera, CNP    Subjective   Chief Complaint:  Med check     HPI:   Cristóbal Ryder is a 31 year old male who presents for med check     HTN:  Blood pressure well controlled with lisinopril 20mg.  At home has been 130/80, today 118/80, 130/80.  He exercises quite regularly with cardio and resistance training.  Admits diet could be better.  Cut out alcohol completely last year.  Felt he didn't really enjoy drinking anyway, wasn't adding anything to his life.        Elevated LFTs: AST 43, ALT 93. US showed marked hepatic steatosis.  Again, has cut out alcohol completely now.     Right calf: sensation of band of light pressure mid calf.  Occasionally this is on the left as well though most often right.  No numbness/tingling. No edema. He notes this improves with activity and is absent completely when he is running or working out.     Allergies:  is allergic to amoxicillin.    SH/FH:  Social History and Family History reviewed and updated.   Tobacco Status:  He  reports that he has never smoked. He has never used smokeless tobacco.    Review of Systems:  A complete head to toe ROS is negative unless otherwise noted in HPI    Objective     Vitals:    09/01/21 1024   BP: 118/80   BP Location: Left arm   Patient Position: Sitting   Cuff Size: Adult Regular   Pulse: 74   SpO2: 98%   Weight: 106.2 kg (234 lb 3.2 oz)       Physical Exam:  GENERAL: Alert, well-appearing   PSYCH: Pleasant mood, affect appropriate.    SKIN: No erythema, edema.   CV: Regular rate and rhythm without murmurs, rubs or gallops.  RESP: Lung sounds clear  PV : LEs warm, symmetric hair distribution. No edema, tenderness or varicosities. Pedal pulses 2+

## 2021-09-02 LAB
HBV SURFACE AB SERPLBLD QL IA.RAPID: POSITIVE
HBV SURFACE AG SERPL QL IA: NONREACTIVE
HCV AB SERPL QL IA: NEGATIVE

## 2021-09-03 LAB
ANA SER QL IF: NEGATIVE
HAV IGG SER QL IA: NEGATIVE
HBV CORE AB SERPL QL IA: NEGATIVE

## 2021-09-04 LAB — SMA IGG SER-ACNC: 12 UNITS

## 2021-09-06 PROBLEM — E66.01 MORBID OBESITY (H): Status: ACTIVE | Noted: 2021-09-06

## 2021-09-06 RX ORDER — LISINOPRIL 20 MG/1
20 TABLET ORAL DAILY
Qty: 90 TABLET | Refills: 3 | Status: SHIPPED | OUTPATIENT
Start: 2021-09-06 | End: 2022-06-23

## 2021-10-16 ENCOUNTER — HEALTH MAINTENANCE LETTER (OUTPATIENT)
Age: 31
End: 2021-10-16

## 2022-05-12 ENCOUNTER — VIRTUAL VISIT (OUTPATIENT)
Dept: INTERNAL MEDICINE | Facility: CLINIC | Age: 32
End: 2022-05-12
Payer: COMMERCIAL

## 2022-05-12 DIAGNOSIS — U07.1 CLINICAL DIAGNOSIS OF COVID-19: Primary | ICD-10-CM

## 2022-05-12 PROCEDURE — 99213 OFFICE O/P EST LOW 20 MIN: CPT | Mod: 95 | Performed by: NURSE PRACTITIONER

## 2022-05-12 NOTE — PROGRESS NOTES
Bong is a 32 year old who is being evaluated via a billable video visit.      How would you like to obtain your AVS? MyChart  If the video visit is dropped, the invitation should be resent by: Send to e-mail at: [email or web address]  Will anyone else be joining your video visit? No      Video Start Time: 11:10 AM    Assessment & Plan     Clinical diagnosis of COVID-19  Hx of HTN  - nirmatrelvir and ritonavir (PAXLOVID) therapy pack; Take 3 tablets by mouth 2 times daily for 5 days Take 2 Nirmatrelvir tablets and 1 Ritonavir tablet twice daily for 5 days.    Jeremie Reyes, [facility]   Bong is a 32 year old who presents for the following health issues  HPI       COVID-19 Symptom Review  How many days ago did these symptoms start? Sunday 5/08/22    Are any of the following symptoms significant for you?  New or worsening difficulty breathing? Yes- nothing extreme though    Please describe what kind of difficulty you are having breathing:Mild dyspnea (able to do ADLs without difficulty, mild shortness of breath with activities such as climbing one or two flights of stairs or walking briskly)    Worsening cough? Yes, I am coughing up mucus.  Can be a dry cough too.    Fever or chills? Yes, I felt feverish and had chills.  No fever.      Headache: YES    Sore throat: YES    Chest pain: No- but chest feels heavy    Diarrhea: No    Body aches? No    What treatments has patient tried? Ibuprofen, sudafed, cough drops and suppressants   Does patient live in a nursing home, group home, or shelter? No  Does patient have a way to get food/medications during quarantined? Yes, I have a friend or family member who can help me.      Review of Systems   Constitutional, HEENT, cardiovascular, pulmonary, gi and gu systems are negative, except as otherwise noted.      Objective           Vitals:  No vitals were obtained today due to virtual visit.    Physical Exam   GENERAL:  Healthy, alert and no distress  EYES: Eyes grossly normal to inspection.  No discharge or erythema, or obvious scleral/conjunctival abnormalities.  RESP: No audible wheeze, cough, or visible cyanosis.  No visible retractions or increased work of breathing.    SKIN: Visible skin clear. No significant rash, abnormal pigmentation or lesions.  NEURO: Cranial nerves grossly intact.  Mentation and speech appropriate for age.  PSYCH: Mentation appears normal, affect normal/bright, judgement and insight intact, normal speech and appearance well-groomed.          Video-Visit Details    Type of service:  Video Visit    Video End Time:11:23 AM    Originating Location (pt. Location): Home    Distant Location (provider location):  [facility]     Platform used for Video Visit: [employer]

## 2022-06-23 ENCOUNTER — OFFICE VISIT (OUTPATIENT)
Dept: FAMILY MEDICINE | Facility: CLINIC | Age: 32
End: 2022-06-23
Payer: COMMERCIAL

## 2022-06-23 VITALS
BODY MASS INDEX: 37.2 KG/M2 | SYSTOLIC BLOOD PRESSURE: 120 MMHG | WEIGHT: 237 LBS | HEART RATE: 79 BPM | DIASTOLIC BLOOD PRESSURE: 70 MMHG | HEIGHT: 67 IN | OXYGEN SATURATION: 98 %

## 2022-06-23 DIAGNOSIS — K90.41 GLUTEN INTOLERANCE: ICD-10-CM

## 2022-06-23 DIAGNOSIS — Z00.00 ROUTINE GENERAL MEDICAL EXAMINATION AT A HEALTH CARE FACILITY: Primary | ICD-10-CM

## 2022-06-23 DIAGNOSIS — K76.0 HEPATIC STEATOSIS: ICD-10-CM

## 2022-06-23 DIAGNOSIS — E78.2 MIXED HYPERLIPIDEMIA: ICD-10-CM

## 2022-06-23 DIAGNOSIS — E66.01 MORBID OBESITY (H): ICD-10-CM

## 2022-06-23 DIAGNOSIS — I10 ESSENTIAL HYPERTENSION: ICD-10-CM

## 2022-06-23 LAB
ALBUMIN SERPL-MCNC: 4 G/DL (ref 3.5–5)
ALP SERPL-CCNC: 67 U/L (ref 45–120)
ALT SERPL W P-5'-P-CCNC: 58 U/L (ref 0–45)
ANION GAP SERPL CALCULATED.3IONS-SCNC: 11 MMOL/L (ref 5–18)
AST SERPL W P-5'-P-CCNC: 30 U/L (ref 0–40)
BILIRUB SERPL-MCNC: 1.7 MG/DL (ref 0–1)
BUN SERPL-MCNC: 11 MG/DL (ref 8–22)
CALCIUM SERPL-MCNC: 9.2 MG/DL (ref 8.5–10.5)
CHLORIDE BLD-SCNC: 105 MMOL/L (ref 98–107)
CHOLEST SERPL-MCNC: 223 MG/DL
CO2 SERPL-SCNC: 25 MMOL/L (ref 22–31)
CREAT SERPL-MCNC: 0.93 MG/DL (ref 0.7–1.3)
FASTING STATUS PATIENT QL REPORTED: YES
GFR SERPL CREATININE-BSD FRML MDRD: >90 ML/MIN/1.73M2
GLUCOSE BLD-MCNC: 88 MG/DL (ref 70–125)
HDLC SERPL-MCNC: 36 MG/DL
LDLC SERPL CALC-MCNC: 157 MG/DL
POTASSIUM BLD-SCNC: 4 MMOL/L (ref 3.5–5)
PROT SERPL-MCNC: 7.5 G/DL (ref 6–8)
SODIUM SERPL-SCNC: 141 MMOL/L (ref 136–145)
TRIGL SERPL-MCNC: 152 MG/DL

## 2022-06-23 PROCEDURE — 36415 COLL VENOUS BLD VENIPUNCTURE: CPT | Performed by: NURSE PRACTITIONER

## 2022-06-23 PROCEDURE — 80061 LIPID PANEL: CPT | Performed by: NURSE PRACTITIONER

## 2022-06-23 PROCEDURE — 99395 PREV VISIT EST AGE 18-39: CPT | Performed by: NURSE PRACTITIONER

## 2022-06-23 PROCEDURE — 80053 COMPREHEN METABOLIC PANEL: CPT | Performed by: NURSE PRACTITIONER

## 2022-06-23 RX ORDER — LISINOPRIL 20 MG/1
20 TABLET ORAL DAILY
Qty: 90 TABLET | Refills: 3 | Status: SHIPPED | OUTPATIENT
Start: 2022-06-23

## 2022-06-23 ASSESSMENT — ENCOUNTER SYMPTOMS
DIARRHEA: 0
FREQUENCY: 0
CONSTIPATION: 0
SHORTNESS OF BREATH: 0
SORE THROAT: 0
NAUSEA: 0
WEAKNESS: 0
NERVOUS/ANXIOUS: 0
DIZZINESS: 0
MYALGIAS: 0
HEARTBURN: 0
JOINT SWELLING: 0
CHILLS: 0
ABDOMINAL PAIN: 0
FEVER: 0
PARESTHESIAS: 0
COUGH: 0
EYE PAIN: 0
DYSURIA: 0
HEADACHES: 0
PALPITATIONS: 0
HEMATURIA: 0
ARTHRALGIAS: 0
HEMATOCHEZIA: 0

## 2022-06-23 NOTE — PROGRESS NOTES
SUBJECTIVE:   CC: Cristóbal Ryder is an 32 year old male who presents for preventative health visit.     HTN:  Continues on lisinopril 20mg.  Blood pressures have been well controlled at home as well.      Gluten:  Experienced generalized pruritis and hives this spring.  Sequentially eliminated things and found cutting out gluten led to complete resolution.  He has also noted GI symptoms resolved with this as well as some brain fogginess.  Requests testing.      Hepatic steatosis:  Liver enzymes improved last check.  Completely stopped alcohol more than a year ago though drinking was not heavy. Now typically once a month.  Admits not exercising regularly      Patient has been advised of split billing requirements and indicates understanding: Yes  Healthy Habits:     Getting at least 3 servings of Calcium per day:  NO    Bi-annual eye exam:  Yes    Dental care twice a year:  Yes    Sleep apnea or symptoms of sleep apnea:  None    Diet:  Regular (no restrictions)    Frequency of exercise:  1 day/week    Duration of exercise:  15-30 minutes    Taking medications regularly:  Yes    PHQ-2 Total Score: 0    Additional concerns today:  Yes              Today's PHQ-2 Score:   PHQ-2 ( 1999 Pfizer) 6/23/2022   Q1: Little interest or pleasure in doing things 0   Q2: Feeling down, depressed or hopeless 0   PHQ-2 Score 0   PHQ-2 Total Score (12-17 Years)- Positive if 3 or more points; Administer PHQ-A if positive -   Q1: Little interest or pleasure in doing things Not at all   Q2: Feeling down, depressed or hopeless Not at all   PHQ-2 Score 0       Abuse: Current or Past(Physical, Sexual or Emotional)- No  Do you feel safe in your environment? Yes    Have you ever done Advance Care Planning? (For example, a Health Directive, POLST, or a discussion with a medical provider or your loved ones about your wishes): Yes, patient states has an Advance Care Planning document and will bring a copy to the clinic.    Social History  "    Tobacco Use     Smoking status: Never Smoker     Smokeless tobacco: Never Used   Substance Use Topics     Alcohol use: Yes     Comment: Alcoholic Drinks/day: 1/week         Alcohol Use 6/23/2022   Prescreen: >3 drinks/day or >7 drinks/week? No       Last PSA: No results found for: PSA    Reviewed orders with patient. Reviewed health maintenance and updated orders accordingly - Yes      Reviewed and updated as needed this visit by clinical staff   Tobacco  Allergies  Meds                Reviewed and updated as needed this visit by Provider                       Review of Systems   Constitutional: Negative for chills and fever.   HENT: Negative for congestion, ear pain, hearing loss and sore throat.    Eyes: Negative for pain and visual disturbance.   Respiratory: Negative for cough and shortness of breath.    Cardiovascular: Negative for chest pain, palpitations and peripheral edema.   Gastrointestinal: Negative for abdominal pain, constipation, diarrhea, heartburn, hematochezia and nausea.   Genitourinary: Negative for dysuria, frequency, genital sores, hematuria, impotence, penile discharge and urgency.   Musculoskeletal: Negative for arthralgias, joint swelling and myalgias.   Skin: Positive for rash.   Neurological: Negative for dizziness, weakness, headaches and paresthesias.   Psychiatric/Behavioral: Negative for mood changes. The patient is not nervous/anxious.          OBJECTIVE:   /70   Pulse 79   Ht 1.708 m (5' 7.25\")   Wt 107.5 kg (237 lb)   SpO2 98%   BMI 36.84 kg/m      Physical Exam  GENERAL: healthy, alert and no distress  EYES: Eyes grossly normal to inspection, PERRL and conjunctivae and sclerae normal  HENT: ear canals and TM's normal, nose and mouth without ulcers or lesions  NECK: no adenopathy, no asymmetry, masses, or scars and thyroid normal to palpation  RESP: lungs clear to auscultation - no rales, rhonchi or wheezes  CV: regular rate and rhythm, normal S1 S2, no S3 or S4, " "no murmur, click or rub, no peripheral edema and peripheral pulses strong  ABDOMEN: soft, nontender, no hepatosplenomegaly, no masses and bowel sounds normal   (male): normal male genitalia without lesions or urethral discharge, no hernia  MS: no gross musculoskeletal defects noted, no edema  SKIN: no suspicious lesions or rashes  NEURO: Normal strength and tone, mentation intact and speech normal  PSYCH: mentation appears normal, affect normal/bright    Diagnostic Test Results:  Labs reviewed in Epic    ASSESSMENT/PLAN:   1. Routine general medical examination at a health care facility  Fasting labs. Up to date on immunizations.     2. Hepatic steatosis  Recheck liver enzymes today.  Encouraged to get back into an exercise routine. Alcohol use is very rare.      3. Morbid obesity (H)  Counseled on exercise and diet.      4. Essential hypertension  Well controlled with lisinopril  - lisinopril (ZESTRIL) 20 MG tablet; Take 1 tablet (20 mg) by mouth daily  Dispense: 90 tablet; Refill: 3  - Comprehensive metabolic panel (BMP + Alb, Alk Phos, ALT, AST, Total. Bili, TP); Future    5. Gluten intolerance  Discussed testing and he is interested in seeing if he has antibodies present. Advised that he could be negative but still have gluten intolerance.  Future orders for Celiac panel after he has added gluten back into diet.     6. Mixed hyperlipidemia  - Lipid panel reflex to direct LDL Fasting; Future        COUNSELING:   Reviewed preventive health counseling, as reflected in patient instructions    Estimated body mass index is 36.84 kg/m  as calculated from the following:    Height as of this encounter: 1.708 m (5' 7.25\").    Weight as of this encounter: 107.5 kg (237 lb).     Weight management plan: Discussed healthy diet and exercise guidelines    He reports that he has never smoked. He has never used smokeless tobacco.      Counseling Resources:  ATP IV Guidelines  Pooled Cohorts Equation Calculator  FRAX Risk " Assessment  ICSI Preventive Guidelines  Dietary Guidelines for Americans, 2010  USDA's MyPlate  ASA Prophylaxis  Lung CA Screening    Katarina Najera, CNP  [facility]

## 2022-07-11 ENCOUNTER — LAB (OUTPATIENT)
Dept: LAB | Facility: CLINIC | Age: 32
End: 2022-07-11
Payer: COMMERCIAL

## 2022-07-11 DIAGNOSIS — K90.41 GLUTEN INTOLERANCE: ICD-10-CM

## 2022-07-11 PROCEDURE — 86364 TISS TRNSGLTMNASE EA IG CLAS: CPT

## 2022-07-11 PROCEDURE — 36415 COLL VENOUS BLD VENIPUNCTURE: CPT

## 2022-07-12 LAB
TTG IGA SER-ACNC: 0.9 U/ML
TTG IGG SER-ACNC: 0.8 U/ML

## 2022-09-06 DIAGNOSIS — I10 ESSENTIAL HYPERTENSION: ICD-10-CM

## 2022-09-06 RX ORDER — LISINOPRIL 20 MG/1
20 TABLET ORAL DAILY
Qty: 90 TABLET | Refills: 3 | OUTPATIENT
Start: 2022-09-06

## 2022-10-01 ENCOUNTER — HEALTH MAINTENANCE LETTER (OUTPATIENT)
Age: 32
End: 2022-10-01

## 2023-08-06 ENCOUNTER — HEALTH MAINTENANCE LETTER (OUTPATIENT)
Age: 33
End: 2023-08-06

## 2024-09-29 ENCOUNTER — HEALTH MAINTENANCE LETTER (OUTPATIENT)
Age: 34
End: 2024-09-29